# Patient Record
Sex: FEMALE | Race: WHITE | Employment: FULL TIME | URBAN - METROPOLITAN AREA
[De-identification: names, ages, dates, MRNs, and addresses within clinical notes are randomized per-mention and may not be internally consistent; named-entity substitution may affect disease eponyms.]

---

## 2018-07-25 ENCOUNTER — OFFICE VISIT (OUTPATIENT)
Dept: FAMILY MEDICINE CLINIC | Age: 38
End: 2018-07-25
Payer: COMMERCIAL

## 2018-07-25 VITALS
OXYGEN SATURATION: 99 % | TEMPERATURE: 97.2 F | WEIGHT: 125 LBS | HEIGHT: 66 IN | RESPIRATION RATE: 16 BRPM | HEART RATE: 98 BPM | DIASTOLIC BLOOD PRESSURE: 70 MMHG | BODY MASS INDEX: 20.09 KG/M2 | SYSTOLIC BLOOD PRESSURE: 108 MMHG

## 2018-07-25 DIAGNOSIS — G43.909 MIGRAINE WITHOUT STATUS MIGRAINOSUS, NOT INTRACTABLE, UNSPECIFIED MIGRAINE TYPE: ICD-10-CM

## 2018-07-25 DIAGNOSIS — Z30.011 ENCOUNTER FOR INITIAL PRESCRIPTION OF CONTRACEPTIVE PILLS: ICD-10-CM

## 2018-07-25 DIAGNOSIS — L70.0 ACNE VULGARIS: Primary | ICD-10-CM

## 2018-07-25 DIAGNOSIS — G43.C0 PERIODIC HEADACHE SYNDROME, NOT INTRACTABLE: ICD-10-CM

## 2018-07-25 DIAGNOSIS — N92.1 METRORRHAGIA: ICD-10-CM

## 2018-07-25 LAB
CONTROL: NORMAL
PREGNANCY TEST URINE, POC: NEGATIVE

## 2018-07-25 PROCEDURE — 99214 OFFICE O/P EST MOD 30 MIN: CPT | Performed by: INTERNAL MEDICINE

## 2018-07-25 PROCEDURE — 81025 URINE PREGNANCY TEST: CPT | Performed by: INTERNAL MEDICINE

## 2018-07-25 RX ORDER — SUMATRIPTAN 50 MG/1
50 TABLET, FILM COATED ORAL
Qty: 12 TABLET | Refills: 3 | Status: SHIPPED | OUTPATIENT
Start: 2018-07-25 | End: 2018-07-25

## 2018-07-25 RX ORDER — ADAPALENE 0.1 G/100G
CREAM TOPICAL NIGHTLY
Qty: 45 G | Refills: 5 | Status: SHIPPED | OUTPATIENT
Start: 2018-07-25 | End: 2018-07-27 | Stop reason: ALTCHOICE

## 2018-07-25 RX ORDER — LEVONORGESTREL / ETHINYL ESTRADIOL AND ETHINYL ESTRADIOL 150-30(84)
1 KIT ORAL DAILY
Qty: 91 TABLET | Refills: 5 | Status: SHIPPED | OUTPATIENT
Start: 2018-07-25 | End: 2019-11-08 | Stop reason: SDUPTHER

## 2018-07-25 RX ORDER — CLINDAMYCIN PHOSPHATE 10 UG/ML
LOTION TOPICAL EVERY EVENING
Qty: 60 G | Refills: 5 | Status: SHIPPED | OUTPATIENT
Start: 2018-07-25 | End: 2018-07-27 | Stop reason: ALTCHOICE

## 2018-07-25 ASSESSMENT — ENCOUNTER SYMPTOMS
EYE WATERING: 0
STRIDOR: 0
PHOTOPHOBIA: 1
EYE REDNESS: 0
ABDOMINAL PAIN: 0
COLOR CHANGE: 1
SHORTNESS OF BREATH: 0
EYE PAIN: 1
CHEST TIGHTNESS: 0
NAUSEA: 0
ABDOMINAL DISTENTION: 0
BACK PAIN: 0
WHEEZING: 0
CHOKING: 0
CONSTIPATION: 0
DIARRHEA: 0

## 2018-07-25 ASSESSMENT — PATIENT HEALTH QUESTIONNAIRE - PHQ9
1. LITTLE INTEREST OR PLEASURE IN DOING THINGS: 0
SUM OF ALL RESPONSES TO PHQ QUESTIONS 1-9: 0
SUM OF ALL RESPONSES TO PHQ9 QUESTIONS 1 & 2: 0
2. FEELING DOWN, DEPRESSED OR HOPELESS: 0

## 2018-07-25 NOTE — PROGRESS NOTES
[address] [facility] [address]  Dept: [phone]  Dept Fax: [phone]    Davie Iglesias is a 30 y.o. female who presents today for her medical conditions/complaints as noted below. Davie Iglesias is c/o of   Chief Complaint   Patient presents with   [facility] Establ[facility] New Doctor    New Patient    Acne     need medication    Contraception     would like to discuss options (unsure of last pap)    Medication Refill     need more imitrex         HPI:     Here to re-establish care   Would like to go back on birth control   Has been on depo in the past and felt it was awful made her bleed for a whole year   Has not been on anything though in about 3-4 ys   Gets very severe menstrual cramps with periods but are otherwise normal every 28-30 days and last 7 days. Also needs refill on imitrex , usually has about 5 bad migraine sa year and the imitrex helps     Also would like something for her cystic acne to face and neck back   Nothing otc seems to be working   Was on gels also prescription form us about 5 years ago and cannot remember if they helped though   Is over due for pap as well   No blood work in about 5 years. Headache    This is a chronic problem. The current episode started more than 1 year ago. The problem occurs monthly. The problem has been unchanged. The pain is located in the bilateral, frontal, occipital and retro-orbital region. The pain does not radiate. The pain quality is not similar to prior headaches. Associated symptoms include eye pain and photophobia. Pertinent negatives include no abdominal pain, anorexia, back pain, dizziness, eye redness, eye watering, fever, nausea, numbness, seizures or weakness. The symptoms are aggravated by activity, caffeine withdrawal, fatigue, emotional stress and menstrual cycle. She has tried acetaminophen, NSAIDs, Excedrin and triptans for the symptoms.  The treatment provided significant relief. Her past medical history is significant for migraine headaches. There is no history of cancer, cluster headaches, hypertension, immunosuppression, recent head traumas, sinus disease or TMJ. Gynecologic Exam   The patient's pertinent negatives include no genital itching, genital lesions, genital odor, genital rash, missed menses, pelvic pain or vaginal discharge. Associated symptoms include rash. Pertinent negatives include no abdominal pain, anorexia, back pain, chills, constipation, diarrhea, fever, headaches, nausea or urgency. She is sexually active. No, her partner does not have an STD. She uses condoms, abstinence, oral contraceptives, a patch and progestin injections for contraception. Her menstrual history has been regular. Her past medical history is significant for menorrhagia and metrorrhagia. There is no history of an abdominal surgery, a  section, an ectopic pregnancy, endometriosis, a gynecological surgery, herpes simplex, ovarian cysts, PID, an STD or vaginosis.        No results found for: LABA1C          ( goal A1C is < 7)   No results found for: LABMICR  No results found for: LDLCHOLESTEROL, LDLCALC    (goal LDL is <100)   BUN (mg/dL)   Date Value   08/10/2015 20     BP Readings from Last 3 Encounters:   18 108/70   16 94/42   08/10/15 107/76          (goal 120/80)    Past Medical History:   Diagnosis Date    Acne 5010    Migraine       Past Surgical History:   Procedure Laterality Date    HERNIA REPAIR      UMBILICAL    LIPOMA RESECTION Right 08/10/2015    rt shoulder lipoma resection    VARICOSE VEIN SURGERY Right        Family History   Problem Relation Age of Onset    Cancer Mother         skin    Other Mother         lupus    High Blood Pressure Mother     High Blood Pressure Father     Asthma Maternal Grandfather     Cancer Maternal Grandfather        Social History   Substance Use Topics    Smoking status: Former Smoker Start date: 4/13/2002     Quit date: 4/13/2008    Smokeless tobacco: Never Used    Alcohol use 0.6 oz/week     1 Glasses of wine per week      Comment: WINE-1 GLASS A WEEK      Current Outpatient Prescriptions   Medication Sig Dispense Refill    clindamycin (CLEOCIN T) 1 % lotion Apply topically every evening 60 g 5    adapalene (DIFFERIN) 0.1 % cream Apply topically nightly 45 g 5    SUMAtriptan (IMITREX) 50 MG tablet Take 1 tablet by mouth once as needed for Migraine 12 tablet 3    Levonorgest-Eth Estrad 91-Day (SEASONIQUE) 0.15-0.03 &0.01 MG TABS Take 1 tablet by mouth daily 91 tablet 5    ibuprofen (ADVIL;MOTRIN) 600 MG tablet Take 1 tablet by mouth every 6 hours as needed for Pain 30 tablet 0     No current facility-administered medications for this visit. No Known Allergies    Health Maintenance   Topic Date Due    HIV screen  11/09/1997    DTaP/Tdap/Td vaccine (1 - Tdap) 11/09/2001    Cervical cancer screen  04/27/2016    Flu vaccine (1) 09/01/2018       Subjective:      Review of Systems   Constitutional: Negative for activity change, appetite change, chills, diaphoresis, fatigue, fever and unexpected weight change. Eyes: Positive for photophobia and pain. Negative for redness and visual disturbance. Respiratory: Negative for choking, chest tightness, shortness of breath, wheezing and stridor. Cardiovascular: Negative for chest pain, palpitations and leg swelling. Gastrointestinal: Negative for abdominal distention, abdominal pain, anorexia, constipation, diarrhea and nausea. Genitourinary: Positive for menorrhagia, menstrual problem and vaginal pain. Negative for decreased urine volume, missed menses, pelvic pain, urgency, vaginal bleeding and vaginal discharge. Musculoskeletal: Negative for arthralgias and back pain. Skin: Positive for color change and rash. Negative for pallor.    Neurological: Negative for dizziness, tremors, seizures, syncope, speech difficulty, weakness, light-headedness, numbness and headaches. Hematological: Negative for adenopathy. Does not bruise/bleed easily. Psychiatric/Behavioral: Negative for sleep disturbance. Objective:     Physical Exam   Constitutional: She is oriented to person, place, and time. She appears well-developed and well-nourished. No distress. HENT:   Head: Normocephalic and atraumatic. Right Ear: External ear normal.   Left Ear: External ear normal.   Nose: Nose normal.   Mouth/Throat: Oropharynx is clear and moist. No oropharyngeal exudate. Eyes: EOM are normal. Pupils are equal, round, and reactive to light. Neck: Normal range of motion. Neck supple. No JVD present. No thyromegaly present. Cardiovascular: Normal rate, regular rhythm and normal heart sounds. Pulmonary/Chest: Effort normal and breath sounds normal. No respiratory distress. She has no wheezes. Abdominal: Soft. Bowel sounds are normal. There is no splenomegaly or hepatomegaly. There is no tenderness. There is no CVA tenderness. Lymphadenopathy:     She has no cervical adenopathy. Neurological: She is alert and oriented to person, place, and time. She has normal strength. No cranial nerve deficit or sensory deficit. Gait normal.   Skin: Skin is warm, dry and intact. Rash noted. Rash is papular and pustular. She is not diaphoretic. No pallor. Psychiatric: She has a normal mood and affect. Her behavior is normal. Judgment and thought content normal.   Nursing note and vitals reviewed. /70 (Site: Left Arm, Position: Sitting, Cuff Size: Medium Adult)   Pulse 98   Temp 97.2 °F (36.2 °C) (Tympanic)   Resp 16   Ht 5' 5.5\" (1.664 m)   Wt 125 lb (56.7 kg)   LMP 07/09/2018 (Exact Date)   SpO2 99%   Breastfeeding? No   BMI 20.48 kg/m²     Assessment:       Diagnosis Orders   1. Acne vulgaris  clindamycin (CLEOCIN T) 1 % lotion    adapalene (DIFFERIN) 0.1 % cream   2.  Encounter for initial prescription of contraceptive pills  POCT urine pregnancy    Levonorgest-Eth Estrad 91-Day (Nolan Landon) 0.15-0.03 &0.01 MG TABS   3. Migraine without status migrainosus, not intractable, unspecified migraine type  CBC Auto Differential   4. Periodic headache syndrome, not intractable  SUMAtriptan (IMITREX) 50 MG tablet   5. Metrorrhagia  POCT urine pregnancy    Levonorgest-Eth Estrad 91-Day (Nolan Landon) 0.15-0.03 &0.01 MG TABS    Luteinizing Hormone    Follicle Stimulating Hormone    Comprehensive Metabolic Panel    TSH with Reflex             Plan:      No Follow-up on file. Orders Placed This Encounter   Procedures    CBC Auto Differential     Standing Status:   Future     Standing Expiration Date:   7/25/2019    Luteinizing Hormone     Standing Status:   Future     Standing Expiration Date:   5/82/3843    Follicle Stimulating Hormone     Standing Status:   Future     Standing Expiration Date:   7/25/2019    Comprehensive Metabolic Panel     Standing Status:   Future     Standing Expiration Date:   7/25/2019    TSH with Reflex     Standing Status:   Future     Standing Expiration Date:   7/25/2019    POCT urine pregnancy     Orders Placed This Encounter   Medications    clindamycin (CLEOCIN T) 1 % lotion     Sig: Apply topically every evening     Dispense:  60 g     Refill:  5    adapalene (DIFFERIN) 0.1 % cream     Sig: Apply topically nightly     Dispense:  45 g     Refill:  5    SUMAtriptan (IMITREX) 50 MG tablet     Sig: Take 1 tablet by mouth once as needed for Migraine     Dispense:  12 tablet     Refill:  3    Levonorgest-Eth Estrad 91-Day (SEASONIQUE) 0.15-0.03 &0.01 MG TABS     Sig: Take 1 tablet by mouth daily     Dispense:  91 tablet     Refill:  5    Trial low dose  Estrogen OCP /seasonque if covered by insurance   Urine preg negative in office   Follow up for PAP/[facility] and birth control check in 3 months  Call if you have any issues with the OCP though.      Refilled Imitrex  Will do topical gels for acne given I advised the oral antibiotic we use can cause OCP to be less effective. Check basic labs. Call with questions or concerns. Patient given educational materials - see patient instructions. Discussed use, benefit, and side effects of prescribed medications. All patient questions answered. Pt voiced understanding. Reviewed health maintenance. Instructed to continue current medications, diet and exercise. Patient agreed with treatment plan. Follow up as directed.      Electronically signed by Gato Carias DO on 7/25/2018 at 1:17 PM

## 2018-07-25 NOTE — PROGRESS NOTES
Visit Information    Have you changed or started any medications since your last visit including any over-the-counter medicines, vitamins, or herbal medicines? no   Are you having any side effects from any of your medications? -  no  Have you stopped taking any of your medications? Is so, why? -  no    Have you seen any other physician or provider since your last visit? No  Have you had any other diagnostic tests since your last visit? No  Have you been seen in the emergency room and/or had an admission to a hospital since we last saw you? No  Have you had your routine dental cleaning in the past 6 months? yes - may 2018      Have you activated your [employer] account? If not, what are your barriers?  No:       Patient Care Team:  Scott Egan DO as PCP - General (Family Medicine)    Medical History Review  Past Medical, Family, and Social History reviewed and does contribute to the patient presenting condition    Health Maintenance   Topic Date Due    HIV screen  1997    DTaP/Tdap/Td vaccine (1 - Tdap) 2001    Cervical cancer screen  2016    Flu vaccine (1) 2018

## 2018-07-27 ENCOUNTER — TELEPHONE (OUTPATIENT)
Dept: FAMILY MEDICINE CLINIC | Age: 38
End: 2018-07-27

## 2018-07-27 RX ORDER — ERYTHROMYCIN AND BENZOYL PEROXIDE 30; 50 MG/G; MG/G
GEL TOPICAL
Qty: 46.6 G | Refills: 11 | Status: SHIPPED | OUTPATIENT
Start: 2018-07-27

## 2018-07-27 NOTE — TELEPHONE ENCOUNTER
Her insurance does not cover  differin 0.1 % crm it is 110.00 dollars.   Is there another crm you can send  That will be cheaper for her   Please advise  [phone]

## 2018-08-01 ENCOUNTER — OFFICE VISIT (OUTPATIENT)
Dept: FAMILY MEDICINE CLINIC | Age: 38
End: 2018-08-01
Payer: COMMERCIAL

## 2018-08-01 ENCOUNTER — HOSPITAL ENCOUNTER (OUTPATIENT)
Age: 38
Setting detail: SPECIMEN
Discharge: HOME | End: 2018-08-01
Payer: COMMERCIAL

## 2018-08-01 VITALS
RESPIRATION RATE: 18 BRPM | SYSTOLIC BLOOD PRESSURE: 122 MMHG | TEMPERATURE: 98.2 F | HEART RATE: 67 BPM | BODY MASS INDEX: 20.41 KG/M2 | HEIGHT: 66 IN | WEIGHT: 127 LBS | OXYGEN SATURATION: 98 % | DIASTOLIC BLOOD PRESSURE: 64 MMHG

## 2018-08-01 DIAGNOSIS — Z01.419 PAP TEST, AS PART OF ROUTINE GYNECOLOGICAL EXAMINATION: Primary | ICD-10-CM

## 2018-08-01 PROCEDURE — 99395 PREV VISIT EST AGE 18-39: CPT | Performed by: INTERNAL MEDICINE

## 2018-08-01 NOTE — PROGRESS NOTES
[address]  [address]  [phone]      Lo Fuller is a 30 y.o. female who presents today for her  medical conditions/complaints as noted below. Lo Fuller is c/o of Gynecologic Exam (Was just on contraception , white discharge after starting pill , x 1 week )  . HPI:     HPI    Past Medical History:   Diagnosis Date    Acne 5010    Migraine 1995      Past Surgical History:   Procedure Laterality Date    HERNIA REPAIR  3674    UMBILICAL    LIPOMA RESECTION Right 08/10/2015    rt shoulder lipoma resection    VARICOSE VEIN SURGERY Right 2010     Family History   Problem Relation Age of Onset    Cancer Mother         skin    Other Mother         lupus    High Blood Pressure Mother     High Blood Pressure Father     Asthma Maternal Grandfather     Cancer Maternal Grandfather      Social History   Substance Use Topics    Smoking status: Former Smoker     Start date: 4/13/2002     Quit date: 4/13/2008    Smokeless tobacco: Never Used    Alcohol use 0.6 oz/week     1 Glasses of wine per week      Comment: WINE-1 GLASS A WEEK      Current Outpatient Prescriptions   Medication Sig Dispense Refill    benzoyl peroxide-erythromycin (BENZAMYCIN) 5-3 % gel Apply topically 2 times daily. 46.6 g 11    SUMAtriptan (IMITREX) 50 MG tablet Take 1 tablet by mouth once as needed for Migraine 12 tablet 3    Levonorgest-Eth Estrad 91-Day (SEASONIQUE) 0.15-0.03 &0.01 MG TABS Take 1 tablet by mouth daily 91 tablet 5    ibuprofen (ADVIL;MOTRIN) 600 MG tablet Take 1 tablet by mouth every 6 hours as needed for Pain 30 tablet 0     No current facility-administered medications for this visit.       No Known Allergies    Health Maintenance   Topic Date Due    HIV screen  11/09/1997    DTaP/Tdap/Td vaccine (1 - Tdap) 11/09/2001    Cervical cancer screen  04/27/2016    Flu vaccine (1) 09/01/2018       Subjective:      normal menses, no abnormal bleeding, pelvic pain or discharge, no breast pain or new or enlarging lumps on self exam, no vaginal bleeding, no discharge or pelvic pain. No new partners. No present concerns. Objective:   /64 (Site: Left Arm, Position: Sitting, Cuff Size: Large Adult)   Pulse 67   Temp 98.2 °F (36.8 °C) (Tympanic)   Resp 18   Ht 5' 5.51\" (1.664 m)   Wt 127 lb (57.6 kg)   LMP 07/09/2018 (Exact Date)   SpO2 98%   Breastfeeding? No   BMI 20.80 kg/m²     General Appearance: alert and oriented to person, place and time, well developed and well- nourished, in no acute distress  Skin: warm and dry, no rash or erythema  Pulmonary/Chest: clear to auscultation bilaterally- no wheezes, rales or rhonchi, normal air movement, no respiratory distress  Cardiovascular: normal rate, regular rhythm, normal S1 and S2, no murmurs, rubs, clicks, or gallops  Abdomen: soft, non-tender, non-distended, normal bowel sounds, no masses or organomegaly  Pelvic: normal external genitalia, vulva, vagina, cervix, uterus and adnexa. No CMT. No masses noted. Mathis shave slight white ,milky discharge at cervical OS. No bleeding or tenderness. Psych: pleasant, cooperative          Assessment:    annual pap exam   Diagnosis Orders   1. Pap test, as part of routine gynecological examination  PAP SMEAR    Human papillomavirus (HPV) DNA probe thin prep high risk       Plan:      No Follow-up on file. Orders Placed This Encounter   Procedures    PAP SMEAR     Order Specific Question:   Collection Type     Answer: Thin Prep     Order Specific Question:   Prior Abnormal Pap Test     Answer:   No     Order Specific Question:   Screening or Diagnostic     Answer:   Screening     Order Specific Question:   HPV Requested?      Answer:   Yes     Order Specific Question:   High Risk Patient     Answer:   Lack of Screening    Human papillomavirus (HPV) DNA probe thin prep high risk     Order Specific Question:   Specify Date & Time of Incident     Answer:   8/1/2018

## 2018-08-07 LAB
HPV SAMPLE: ABNORMAL
HPV SOURCE: ABNORMAL
HPV, GENOTYPE 16: DETECTED
HPV, GENOTYPE 18: NOT DETECTED
HPV, HIGH RISK OTHER: DETECTED
HPV, INTERPRETATION: ABNORMAL

## 2018-08-20 LAB — CYTOLOGY REPORT: NORMAL

## 2018-08-21 DIAGNOSIS — R87.810 ATYPICAL SQUAMOUS CELL CHANGES OF UNDETERMINED SIGNIFICANCE (ASCUS) ON CERVICAL CYTOLOGY WITH POSITIVE HIGH RISK HUMAN PAPILLOMA VIRUS (HPV): Primary | ICD-10-CM

## 2018-08-21 DIAGNOSIS — R87.610 ATYPICAL SQUAMOUS CELL CHANGES OF UNDETERMINED SIGNIFICANCE (ASCUS) ON CERVICAL CYTOLOGY WITH POSITIVE HIGH RISK HUMAN PAPILLOMA VIRUS (HPV): Primary | ICD-10-CM

## 2019-11-08 DIAGNOSIS — N92.1 METRORRHAGIA: ICD-10-CM

## 2019-11-08 DIAGNOSIS — Z30.011 ENCOUNTER FOR INITIAL PRESCRIPTION OF CONTRACEPTIVE PILLS: ICD-10-CM

## 2019-11-08 RX ORDER — LEVONORGESTREL / ETHINYL ESTRADIOL AND ETHINYL ESTRADIOL 150-30(84)
KIT ORAL
Qty: 91 TABLET | Refills: 5 | Status: SHIPPED | OUTPATIENT
Start: 2019-11-08